# Patient Record
Sex: FEMALE
[De-identification: names, ages, dates, MRNs, and addresses within clinical notes are randomized per-mention and may not be internally consistent; named-entity substitution may affect disease eponyms.]

---

## 2017-12-18 PROCEDURE — 87081 CULTURE SCREEN ONLY: CPT | Performed by: OBSTETRICS & GYNECOLOGY

## 2017-12-18 PROCEDURE — 87653 STREP B DNA AMP PROBE: CPT | Performed by: OBSTETRICS & GYNECOLOGY

## 2017-12-19 PROBLEM — O99.013 ANEMIA DURING PREGNANCY IN THIRD TRIMESTER: Status: ACTIVE | Noted: 2017-12-19

## 2018-01-01 ENCOUNTER — HOSPITAL (OUTPATIENT)
Dept: OTHER | Age: 28
End: 2018-01-01
Attending: OBSTETRICS & GYNECOLOGY

## 2018-01-16 ENCOUNTER — HOSPITAL (OUTPATIENT)
Dept: OTHER | Age: 28
End: 2018-01-16
Attending: OBSTETRICS & GYNECOLOGY

## 2018-01-16 LAB
ANALYZER ANC (IANC): ABNORMAL
BASE DEFICIT BLDCOA-SCNC: 4 MMOL/L
BASE DEFICIT BLDCOV-SCNC: 4 MMOL/L
BASE EXCESS BLDCOA CALC-SCNC: NORMAL MMOL/L
BASE EXCESS-RC: NORMAL
ERYTHROCYTE [DISTWIDTH] IN BLOOD: 13.7 % (ref 11–15)
HCO3 BLDCOA-SCNC: 22 MMOL/L (ref 21–28)
HCO3 BLDCOV-SCNC: 22 MMOL/L (ref 22–29)
HEMATOCRIT: 30.3 % (ref 36–46.5)
HEMATOCRIT: 31.6 % (ref 36–46.5)
HGB BLD-MCNC: 10.4 GM/DL (ref 12–15.5)
HGB BLD-MCNC: 9.9 GM/DL (ref 12–15.5)
HIV1 AB SERPL QL IA: NONREACTIVE
MCH RBC QN AUTO: 27.4 PG (ref 26–34)
MCHC RBC AUTO-ENTMCNC: 32.9 GM/DL (ref 32–36.5)
MCV RBC AUTO: 83.4 FL (ref 78–100)
PCO2 BLDCOA: 41 MM HG (ref 31–74)
PCO2 BLDCOV: 39 MM HG (ref 23–49)
PH BLDCOA: 7.33 UNIT (ref 7.18–7.38)
PH BLDCOV: 7.35 UNIT (ref 7.25–7.45)
PLATELET # BLD: 208 THOUSAND/MCL (ref 140–450)
PO2 BLDCOV: 23 MM HG (ref 17–41)
PO2 RC ARTERIAL CORD (RACPO): 23 MM HG (ref 6–31)
RBC # BLD: 3.79 MILLION/MCL (ref 4–5.2)
WBC # BLD: 12.3 THOUSAND/MCL (ref 4.2–11)

## 2018-01-17 LAB
ANALYZER ANC (IANC): ABNORMAL
ERYTHROCYTE [DISTWIDTH] IN BLOOD: 13.9 % (ref 11–15)
HEMATOCRIT: 29.2 % (ref 36–46.5)
HGB BLD-MCNC: 9.5 GM/DL (ref 12–15.5)
MCH RBC QN AUTO: 27.7 PG (ref 26–34)
MCHC RBC AUTO-ENTMCNC: 32.5 GM/DL (ref 32–36.5)
MCV RBC AUTO: 85.1 FL (ref 78–100)
PLATELET # BLD: 200 THOUSAND/MCL (ref 140–450)
RBC # BLD: 3.43 MILLION/MCL (ref 4–5.2)
WBC # BLD: 11.8 THOUSAND/MCL (ref 4.2–11)

## 2018-02-28 PROBLEM — O99.013 ANEMIA DURING PREGNANCY IN THIRD TRIMESTER: Status: RESOLVED | Noted: 2017-12-19 | Resolved: 2018-02-28

## 2018-02-28 PROCEDURE — 87624 HPV HI-RISK TYP POOLED RSLT: CPT | Performed by: OBSTETRICS & GYNECOLOGY

## 2018-02-28 PROCEDURE — 88175 CYTOPATH C/V AUTO FLUID REDO: CPT | Performed by: OBSTETRICS & GYNECOLOGY

## 2019-05-10 PROCEDURE — 88175 CYTOPATH C/V AUTO FLUID REDO: CPT | Performed by: OBSTETRICS & GYNECOLOGY

## 2019-05-10 PROCEDURE — 87624 HPV HI-RISK TYP POOLED RSLT: CPT | Performed by: OBSTETRICS & GYNECOLOGY

## 2020-02-14 PROBLEM — Z34.80 ENCOUNTER FOR SUPERVISION OF OTHER NORMAL PREGNANCY, UNSPECIFIED TRIMESTER: Status: ACTIVE | Noted: 2020-02-14

## 2020-02-27 PROBLEM — F17.200 SMOKER: Status: ACTIVE | Noted: 2020-02-27

## 2020-02-27 PROBLEM — Z98.890 HISTORY OF LOOP ELECTRICAL EXCISION PROCEDURE (LEEP): Status: ACTIVE | Noted: 2020-02-27

## 2020-04-09 ENCOUNTER — OFFICE VISIT (OUTPATIENT)
Dept: PERINATAL CARE | Facility: HOSPITAL | Age: 30
End: 2020-04-09
Attending: PEDIATRICS
Payer: COMMERCIAL

## 2020-04-09 PROCEDURE — 76827 ECHO EXAM OF FETAL HEART: CPT

## 2020-04-09 PROCEDURE — 93325 DOPPLER ECHO COLOR FLOW MAPG: CPT

## 2020-04-09 PROCEDURE — 76825 ECHO EXAM OF FETAL HEART: CPT

## 2020-04-09 NOTE — PROGRESS NOTES
Zuleyka Monae    Dear Dr. Eloise Elizondo     Thank you for requesting fetal echocardiogram and  cardiology consultation  consultation on your patient Rosas.   As you are aware she is a 34year old female  with a gutierrez VAGINAL BLEEDING, VARICELLA, or X-RAY EXPOSURE.     Past Surgical History  The patient  has a past surgical history that includes leep (12/2014); leep (04/2015); and appendectomy.     Family History  The patient She indicated that her mother is alive.  She interrogations. The aortic arch was a left aortic arch. The aortic arch appeared to be normal without any evidence of coarctation. There was no pericardial effusion. The fetal heart rates were normal with one to one conduction.    The ductus venous trajecto face-to-face time was 60 minutes.

## 2020-05-06 ENCOUNTER — OFFICE VISIT (OUTPATIENT)
Dept: PERINATAL CARE | Facility: HOSPITAL | Age: 30
End: 2020-05-06
Attending: PEDIATRICS
Payer: COMMERCIAL

## 2020-05-06 PROCEDURE — 76825 ECHO EXAM OF FETAL HEART: CPT

## 2020-05-06 PROCEDURE — 76827 ECHO EXAM OF FETAL HEART: CPT

## 2020-05-06 PROCEDURE — 93325 DOPPLER ECHO COLOR FLOW MAPG: CPT

## 2020-05-06 NOTE — PROGRESS NOTES
Mellissa Shaw  Dear Dr. Hollis,     Thank you for requesting fetal echocardiogram and  cardiology consultation  consultation on your patient Tamara San.  As you are aware she is a 34year old female  with a singleto NAUSEA/VOMITING, NERVOUS/MENTAL DISORDER, NEURAL TUBE DEFECT, PREVIOUS STILL BIRTH, PULMONARY DISEASE, SEIZURE DISORDER, SICKLE CELL, SWELLING, KARYNA SACHS, THALLASEMIA, TOXOPLASMOSIS, TYPE 1 DIABETES, URINARY PROBLEMS, VAGINAL BLEEDING, VARICELLA, or X-RAY normal with no stenosis. There was normal bifurcation of the main pulmonary artery. The left pulmonary artery was not studied in detail. The ductus arteriosus was open with a normal right to left shunting.  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 ? Levorotation. No obvious right sided intra thoracic mass was noted. No obvious left lung hypoplasia. I answered her questions.  She appeared to have a good understanding the issues we covered.     Thank you for allowing me to part

## 2020-05-13 PROBLEM — F32.A DEPRESSION AFFECTING PREGNANCY IN THIRD TRIMESTER, ANTEPARTUM: Status: ACTIVE | Noted: 2020-05-13

## 2020-05-13 PROBLEM — O99.343 DEPRESSION AFFECTING PREGNANCY IN THIRD TRIMESTER, ANTEPARTUM: Status: ACTIVE | Noted: 2020-05-13

## 2020-07-01 ENCOUNTER — OFFICE VISIT (OUTPATIENT)
Dept: PERINATAL CARE | Facility: HOSPITAL | Age: 30
End: 2020-07-01
Attending: PEDIATRICS
Payer: COMMERCIAL

## 2020-07-01 PROCEDURE — 93325 DOPPLER ECHO COLOR FLOW MAPG: CPT

## 2020-07-01 PROCEDURE — 76827 ECHO EXAM OF FETAL HEART: CPT

## 2020-07-01 PROCEDURE — 76825 ECHO EXAM OF FETAL HEART: CPT

## 2020-07-01 NOTE — PROGRESS NOTES
Liberty Blood  Dear Dr. Hollis,     Thank you for requesting a follow up fetal echocardiogram and follow up   cardiology consultation  consultation on your patient Tamara San.  As you are aware she is a 34year old female DYSTROPHY, NAUSEA/VOMITING, NERVOUS/MENTAL DISORDER, NEURAL TUBE DEFECT, PREVIOUS STILL BIRTH, PULMONARY DISEASE, SEIZURE DISORDER, SICKLE CELL, SWELLING, KARYNA SACHS, THALLASEMIA, TOXOPLASMOSIS, TYPE 1 DIABETES, URINARY PROBLEMS, VAGINAL BLEEDING, VARICELLA valve appeared to be normal with no stenosis. There was normal bifurcation of the main pulmonary artery. The ductus arteriosus was open with a normal right to left shunting. Two pulmonary veins appeared to be entering into the left atrium.  The mitral valv rhabdomyoma was noted. ? Levorotation. No obvious right sided intra thoracic mass was noted. No obvious left lung hypoplasia. I answered her questions.  She appeared to have a good understanding the issues we covered.     Thank you for allowing me to part

## 2020-08-07 ENCOUNTER — HOSPITAL ENCOUNTER (INPATIENT)
Facility: HOSPITAL | Age: 30
LOS: 2 days | Discharge: HOME | End: 2020-08-09
Attending: OBSTETRICS & GYNECOLOGY | Admitting: OBSTETRICS & GYNECOLOGY
Payer: COMMERCIAL

## 2020-08-07 ENCOUNTER — ANESTHESIA (OUTPATIENT)
Dept: OBGYN UNIT | Facility: HOSPITAL | Age: 30
End: 2020-08-07
Payer: COMMERCIAL

## 2020-08-07 ENCOUNTER — ANESTHESIA EVENT (OUTPATIENT)
Dept: OBGYN UNIT | Facility: HOSPITAL | Age: 30
End: 2020-08-07
Payer: COMMERCIAL

## 2020-08-07 PROBLEM — Z34.90 PREGNANCY: Status: ACTIVE | Noted: 2020-08-07

## 2020-08-07 LAB
ANTIBODY SCREEN: NEGATIVE
BASOPHILS # BLD AUTO: 0.06 X10(3) UL (ref 0–0.2)
BASOPHILS NFR BLD AUTO: 0.3 %
DEPRECATED RDW RBC AUTO: 40 FL (ref 35.1–46.3)
EOSINOPHIL # BLD AUTO: 0.05 X10(3) UL (ref 0–0.7)
EOSINOPHIL NFR BLD AUTO: 0.3 %
ERYTHROCYTE [DISTWIDTH] IN BLOOD BY AUTOMATED COUNT: 12.9 % (ref 11–15)
HCT VFR BLD AUTO: 33.4 % (ref 35–48)
HGB BLD-MCNC: 11.2 G/DL (ref 12–16)
IMM GRANULOCYTES # BLD AUTO: 0.21 X10(3) UL (ref 0–1)
IMM GRANULOCYTES NFR BLD: 1.2 %
LYMPHOCYTES # BLD AUTO: 1.62 X10(3) UL (ref 1–4)
LYMPHOCYTES NFR BLD AUTO: 8.9 %
MCH RBC QN AUTO: 28.9 PG (ref 26–34)
MCHC RBC AUTO-ENTMCNC: 33.5 G/DL (ref 31–37)
MCV RBC AUTO: 86.1 FL (ref 80–100)
MONOCYTES # BLD AUTO: 1.61 X10(3) UL (ref 0.1–1)
MONOCYTES NFR BLD AUTO: 8.9 %
NEUTROPHILS # BLD AUTO: 14.56 X10 (3) UL (ref 1.5–7.7)
NEUTROPHILS # BLD AUTO: 14.56 X10(3) UL (ref 1.5–7.7)
NEUTROPHILS NFR BLD AUTO: 80.4 %
PLATELET # BLD AUTO: 187 10(3)UL (ref 150–450)
RBC # BLD AUTO: 3.88 X10(6)UL (ref 3.8–5.3)
RH BLOOD TYPE: POSITIVE
SARS-COV-2 RNA RESP QL NAA+PROBE: DETECTED
T PALLIDUM AB SER QL IA: NONREACTIVE
WBC # BLD AUTO: 18.1 X10(3) UL (ref 4–11)

## 2020-08-07 PROCEDURE — 87205 SMEAR GRAM STAIN: CPT | Performed by: OBSTETRICS & GYNECOLOGY

## 2020-08-07 PROCEDURE — 87070 CULTURE OTHR SPECIMN AEROBIC: CPT | Performed by: OBSTETRICS & GYNECOLOGY

## 2020-08-07 PROCEDURE — 85025 COMPLETE CBC W/AUTO DIFF WBC: CPT | Performed by: OBSTETRICS & GYNECOLOGY

## 2020-08-07 PROCEDURE — 99214 OFFICE O/P EST MOD 30 MIN: CPT

## 2020-08-07 PROCEDURE — 86900 BLOOD TYPING SEROLOGIC ABO: CPT | Performed by: OBSTETRICS & GYNECOLOGY

## 2020-08-07 PROCEDURE — 86901 BLOOD TYPING SEROLOGIC RH(D): CPT | Performed by: OBSTETRICS & GYNECOLOGY

## 2020-08-07 PROCEDURE — 80053 COMPREHEN METABOLIC PANEL: CPT | Performed by: INTERNAL MEDICINE

## 2020-08-07 PROCEDURE — 0HQ9XZZ REPAIR PERINEUM SKIN, EXTERNAL APPROACH: ICD-10-PCS | Performed by: OBSTETRICS & GYNECOLOGY

## 2020-08-07 PROCEDURE — 88307 TISSUE EXAM BY PATHOLOGIST: CPT | Performed by: OBSTETRICS & GYNECOLOGY

## 2020-08-07 PROCEDURE — 87075 CULTR BACTERIA EXCEPT BLOOD: CPT | Performed by: OBSTETRICS & GYNECOLOGY

## 2020-08-07 PROCEDURE — 82728 ASSAY OF FERRITIN: CPT | Performed by: INTERNAL MEDICINE

## 2020-08-07 PROCEDURE — 86780 TREPONEMA PALLIDUM: CPT | Performed by: OBSTETRICS & GYNECOLOGY

## 2020-08-07 PROCEDURE — 87077 CULTURE AEROBIC IDENTIFY: CPT | Performed by: OBSTETRICS & GYNECOLOGY

## 2020-08-07 PROCEDURE — 87076 CULTURE ANAEROBE IDENT EACH: CPT | Performed by: OBSTETRICS & GYNECOLOGY

## 2020-08-07 PROCEDURE — 86850 RBC ANTIBODY SCREEN: CPT | Performed by: OBSTETRICS & GYNECOLOGY

## 2020-08-07 RX ORDER — BISACODYL 10 MG
10 SUPPOSITORY, RECTAL RECTAL ONCE AS NEEDED
Status: DISCONTINUED | OUTPATIENT
Start: 2020-08-07 | End: 2020-08-09

## 2020-08-07 RX ORDER — TERBUTALINE SULFATE 1 MG/ML
0.25 INJECTION, SOLUTION SUBCUTANEOUS AS NEEDED
Status: DISCONTINUED | OUTPATIENT
Start: 2020-08-07 | End: 2020-08-07 | Stop reason: HOSPADM

## 2020-08-07 RX ORDER — IBUPROFEN 600 MG/1
600 TABLET ORAL EVERY 6 HOURS
Status: DISCONTINUED | OUTPATIENT
Start: 2020-08-07 | End: 2020-08-09

## 2020-08-07 RX ORDER — IBUPROFEN 600 MG/1
600 TABLET ORAL EVERY 6 HOURS PRN
Status: DISCONTINUED | OUTPATIENT
Start: 2020-08-07 | End: 2020-08-07

## 2020-08-07 RX ORDER — ACETAMINOPHEN 500 MG
500 TABLET ORAL EVERY 6 HOURS PRN
Status: DISCONTINUED | OUTPATIENT
Start: 2020-08-07 | End: 2020-08-07

## 2020-08-07 RX ORDER — ACETAMINOPHEN 325 MG/1
650 TABLET ORAL EVERY 6 HOURS PRN
Status: DISCONTINUED | OUTPATIENT
Start: 2020-08-07 | End: 2020-08-09

## 2020-08-07 RX ORDER — ONDANSETRON 2 MG/ML
4 INJECTION INTRAMUSCULAR; INTRAVENOUS EVERY 6 HOURS PRN
Status: DISCONTINUED | OUTPATIENT
Start: 2020-08-07 | End: 2020-08-07 | Stop reason: HOSPADM

## 2020-08-07 RX ORDER — DIPHENHYDRAMINE HYDROCHLORIDE 50 MG/ML
12.5 INJECTION INTRAMUSCULAR; INTRAVENOUS EVERY 4 HOURS PRN
Status: DISCONTINUED | OUTPATIENT
Start: 2020-08-07 | End: 2020-08-07

## 2020-08-07 RX ORDER — SIMETHICONE 80 MG
80 TABLET,CHEWABLE ORAL 3 TIMES DAILY PRN
Status: DISCONTINUED | OUTPATIENT
Start: 2020-08-07 | End: 2020-08-09

## 2020-08-07 RX ORDER — EPHEDRINE SULFATE/0.9% NACL/PF 25 MG/5 ML
5 SYRINGE (ML) INTRAVENOUS AS NEEDED
Status: DISCONTINUED | OUTPATIENT
Start: 2020-08-07 | End: 2020-08-07

## 2020-08-07 RX ORDER — AMMONIA INHALANTS 0.04 G/.3ML
0.3 INHALANT RESPIRATORY (INHALATION) AS NEEDED
Status: DISCONTINUED | OUTPATIENT
Start: 2020-08-07 | End: 2020-08-07 | Stop reason: HOSPADM

## 2020-08-07 RX ORDER — DOCUSATE SODIUM 100 MG/1
100 CAPSULE, LIQUID FILLED ORAL
Status: DISCONTINUED | OUTPATIENT
Start: 2020-08-07 | End: 2020-08-09

## 2020-08-07 RX ORDER — TRISODIUM CITRATE DIHYDRATE AND CITRIC ACID MONOHYDRATE 500; 334 MG/5ML; MG/5ML
30 SOLUTION ORAL AS NEEDED
Status: DISCONTINUED | OUTPATIENT
Start: 2020-08-07 | End: 2020-08-07 | Stop reason: HOSPADM

## 2020-08-07 NOTE — ANESTHESIA PROCEDURE NOTES
Labor Analgesia  Performed by: Royal Efren MD  Authorized by: Royal Efren MD       General Information and Staff    Start Time:   Anesthesiologist: Royal Efren MD  Performed by:   Anesthesiologist  Patient Location:  OB  Site Identification: surface land

## 2020-08-07 NOTE — PROGRESS NOTES
Pt is a 27year old female admitted to TR5/[facility]5-A. Patient presents with:  R/o Labor:  since 12 noon     Pt is  38w2d intra-uterine pregnancy. History obtained, consents signed. Oriented to room, staff, and plan of care.   Updated Malgorzata Pacheco about

## 2020-08-07 NOTE — ANESTHESIA PREPROCEDURE EVALUATION
PRE-OP EVALUATION    Patient Name: Radha San    Pre-op Diagnosis: labor pains, IUP@ 38.2 weeks    Labor epidural    Pre-op vitals reviewed. Pulse: 116  BP: 135/87     Body mass index is 29.87 kg/m². Current medications reviewed.   JENNY THAPA History    Tobacco Use      Smoking status: Current Every Day Smoker        Packs/day: 0.30        Years: 4.00        Pack years: 1.2        Types: Cigarettes        Quit date: 12/1/2016        Years since quitting: 3.6      Smokeless tobacco: Former User

## 2020-08-07 NOTE — ANESTHESIA PREPROCEDURE EVALUATION
PRE-OP EVALUATION    Patient Name: Lorne San    Pre-op Diagnosis: * No pre-op diagnosis entered *    * No procedures listed *    * No surgeons found in log *    Pre-op vitals reviewed. Pulse: 116  BP: 135/87     Body mass index is 29.87 kg/m². Past Surgical History:   Procedure Laterality Date   • APPENDECTOMY      04/2007   • LEEP  12/2014   • LEEP  04/2015     Social History    Tobacco Use      Smoking status: Current Every Day Smoker        Packs/day: 0.30        Years: 4.00

## 2020-08-07 NOTE — H&P
[address] and Delivery Prenatal History and Physical Interval Addendum  Please see full Prenatal Record for this pregnancy      SUBJECTIVE:    Interval History:      This is a pregnancy at 38 weeks admitted for labor beginning this afternoon  U

## 2020-08-08 ENCOUNTER — APPOINTMENT (OUTPATIENT)
Dept: GENERAL RADIOLOGY | Facility: HOSPITAL | Age: 30
End: 2020-08-08
Attending: INTERNAL MEDICINE
Payer: COMMERCIAL

## 2020-08-08 LAB
ALBUMIN SERPL-MCNC: 3.2 G/DL (ref 3.4–5)
ALBUMIN/GLOB SERPL: 0.8 {RATIO} (ref 1–2)
ALP LIVER SERPL-CCNC: 182 U/L (ref 37–98)
ALT SERPL-CCNC: 11 U/L (ref 13–56)
ANION GAP SERPL CALC-SCNC: 7 MMOL/L (ref 0–18)
AST SERPL-CCNC: 14 U/L (ref 15–37)
BASOPHILS # BLD AUTO: 0.05 X10(3) UL (ref 0–0.2)
BASOPHILS NFR BLD AUTO: 0.3 %
BILIRUB SERPL-MCNC: 0.5 MG/DL (ref 0.1–2)
BUN BLD-MCNC: 5 MG/DL (ref 7–18)
BUN/CREAT SERPL: 7.8 (ref 10–20)
CALCIUM BLD-MCNC: 8.6 MG/DL (ref 8.5–10.1)
CHLORIDE SERPL-SCNC: 107 MMOL/L (ref 98–112)
CO2 SERPL-SCNC: 21 MMOL/L (ref 21–32)
CREAT BLD-MCNC: 0.64 MG/DL (ref 0.55–1.02)
DEPRECATED HBV CORE AB SER IA-ACNC: 4.2 NG/ML (ref 12–160)
DEPRECATED RDW RBC AUTO: 40.7 FL (ref 35.1–46.3)
EOSINOPHIL # BLD AUTO: 0.07 X10(3) UL (ref 0–0.7)
EOSINOPHIL NFR BLD AUTO: 0.4 %
ERYTHROCYTE [DISTWIDTH] IN BLOOD BY AUTOMATED COUNT: 12.9 % (ref 11–15)
GLOBULIN PLAS-MCNC: 3.9 G/DL (ref 2.8–4.4)
GLUCOSE BLD-MCNC: 92 MG/DL (ref 70–99)
HCT VFR BLD AUTO: 30.4 % (ref 35–48)
HGB BLD-MCNC: 9.7 G/DL (ref 12–16)
IMM GRANULOCYTES # BLD AUTO: 0.25 X10(3) UL (ref 0–1)
IMM GRANULOCYTES NFR BLD: 1.6 %
LYMPHOCYTES # BLD AUTO: 2.25 X10(3) UL (ref 1–4)
LYMPHOCYTES NFR BLD AUTO: 14 %
M PROTEIN MFR SERPL ELPH: 7.1 G/DL (ref 6.4–8.2)
MCH RBC QN AUTO: 28.2 PG (ref 26–34)
MCHC RBC AUTO-ENTMCNC: 31.9 G/DL (ref 31–37)
MCV RBC AUTO: 88.4 FL (ref 80–100)
MONOCYTES # BLD AUTO: 1.23 X10(3) UL (ref 0.1–1)
MONOCYTES NFR BLD AUTO: 7.7 %
NEUTROPHILS # BLD AUTO: 12.19 X10 (3) UL (ref 1.5–7.7)
NEUTROPHILS # BLD AUTO: 12.19 X10(3) UL (ref 1.5–7.7)
NEUTROPHILS NFR BLD AUTO: 76 %
OSMOLALITY SERPL CALC.SUM OF ELEC: 277 MOSM/KG (ref 275–295)
PLATELET # BLD AUTO: 187 10(3)UL (ref 150–450)
POTASSIUM SERPL-SCNC: 4.2 MMOL/L (ref 3.5–5.1)
RBC # BLD AUTO: 3.44 X10(6)UL (ref 3.8–5.3)
SODIUM SERPL-SCNC: 135 MMOL/L (ref 136–145)
WBC # BLD AUTO: 16 X10(3) UL (ref 4–11)

## 2020-08-08 PROCEDURE — 71045 X-RAY EXAM CHEST 1 VIEW: CPT | Performed by: INTERNAL MEDICINE

## 2020-08-08 PROCEDURE — 85025 COMPLETE CBC W/AUTO DIFF WBC: CPT | Performed by: OBSTETRICS & GYNECOLOGY

## 2020-08-08 NOTE — PROGRESS NOTES
Labor Analgesia Follow Up Note    Patient underwent epidural anesthesia for labor analgesia,    Placenta Date/Time: 8/7/2020  7:26 PM    Delivery Date/Time[de-identified] 8/7/2020  7:18 PM    /78   Pulse 79   Temp 97.8 °F (36.6 °C) (Oral)   Resp 16   Ht 1.626 m (

## 2020-08-08 NOTE — PROGRESS NOTES
Pt desires discharge today  Instructions given  Follow up 6 weeks  Circ done    Pt and  advised n hypercoagulability of pregnancy, postpartum state and Covid 19. Lovenox SQ advised for 6 weeks. Pt and  decline Lovenox.   ASA 81 mg as an [employer]

## 2020-08-08 NOTE — PROGRESS NOTES
Notified Dr Thelma Krishna that pt may want to go home tonight. Dr stated pt ok for discharge on her end. Informed MD of chest xray result.

## 2020-08-08 NOTE — PROGRESS NOTES
Ligia San Patient Status:  Inpatient    1990 MRN [id number]   Location [facility] 1SW-J Attending Pretty Hobbs MD   Hosp Day # 1 PCP Mary Xiong,      Pt has no complaints  Breast Feeding:  yes    Afebrile  VS stable  Abdom

## 2020-08-08 NOTE — L&D DELIVERY NOTE
This patient was admitted to the hospital and proceeded to have a vaginal birth. A male infant was delivered. Pt complete, AROM clear. Then had urge to push. Head delivery was controlled. Fetal body delivered without difficulty.   Nares and mouth were

## 2020-08-08 NOTE — PROGRESS NOTES
Pt was admitted in labor  She was in active labor and requested epidural.   I saw pt as she was awaiting epidural. I was in the room for approximately 2 minutes.   I returned after her epidural was placed and did a vaginal exam. Was in room for approximatel

## 2020-08-08 NOTE — PROGRESS NOTES
Pt transferred via wheelchair in stable condition to room 1119. Infant transferred via isolette in stable condition to room 1119. FOB @ their side. ID bands checked and verified. Report given to [facility], RN.

## 2020-08-09 VITALS
WEIGHT: 174 LBS | HEIGHT: 64 IN | SYSTOLIC BLOOD PRESSURE: 124 MMHG | OXYGEN SATURATION: 99 % | HEART RATE: 78 BPM | BODY MASS INDEX: 29.71 KG/M2 | TEMPERATURE: 98 F | RESPIRATION RATE: 18 BRPM | DIASTOLIC BLOOD PRESSURE: 77 MMHG

## 2020-08-09 LAB
ALBUMIN SERPL-MCNC: 2.4 G/DL (ref 3.4–5)
ALBUMIN/GLOB SERPL: 0.7 {RATIO} (ref 1–2)
ALP LIVER SERPL-CCNC: 117 U/L (ref 37–98)
ALT SERPL-CCNC: 11 U/L (ref 13–56)
ANION GAP SERPL CALC-SCNC: 3 MMOL/L (ref 0–18)
AST SERPL-CCNC: 19 U/L (ref 15–37)
BASOPHILS # BLD AUTO: 0.05 X10(3) UL (ref 0–0.2)
BASOPHILS NFR BLD AUTO: 0.4 %
BILIRUB SERPL-MCNC: 0.3 MG/DL (ref 0.1–2)
BUN BLD-MCNC: 6 MG/DL (ref 7–18)
BUN/CREAT SERPL: 8.6 (ref 10–20)
CALCIUM BLD-MCNC: 8.8 MG/DL (ref 8.5–10.1)
CHLORIDE SERPL-SCNC: 110 MMOL/L (ref 98–112)
CO2 SERPL-SCNC: 24 MMOL/L (ref 21–32)
CREAT BLD-MCNC: 0.7 MG/DL (ref 0.55–1.02)
D-DIMER: 0.48 UG/ML FEU (ref ?–0.5)
DEPRECATED RDW RBC AUTO: 42.5 FL (ref 35.1–46.3)
EOSINOPHIL # BLD AUTO: 0.14 X10(3) UL (ref 0–0.7)
EOSINOPHIL NFR BLD AUTO: 1.2 %
ERYTHROCYTE [DISTWIDTH] IN BLOOD BY AUTOMATED COUNT: 13.1 % (ref 11–15)
GLOBULIN PLAS-MCNC: 3.6 G/DL (ref 2.8–4.4)
GLUCOSE BLD-MCNC: 76 MG/DL (ref 70–99)
HCT VFR BLD AUTO: 31.3 % (ref 35–48)
HGB BLD-MCNC: 10.1 G/DL (ref 12–16)
IMM GRANULOCYTES # BLD AUTO: 0.19 X10(3) UL (ref 0–1)
IMM GRANULOCYTES NFR BLD: 1.7 %
LYMPHOCYTES # BLD AUTO: 2.39 X10(3) UL (ref 1–4)
LYMPHOCYTES NFR BLD AUTO: 21.2 %
M PROTEIN MFR SERPL ELPH: 6 G/DL (ref 6.4–8.2)
MCH RBC QN AUTO: 28.6 PG (ref 26–34)
MCHC RBC AUTO-ENTMCNC: 32.3 G/DL (ref 31–37)
MCV RBC AUTO: 88.7 FL (ref 80–100)
MONOCYTES # BLD AUTO: 0.78 X10(3) UL (ref 0.1–1)
MONOCYTES NFR BLD AUTO: 6.9 %
NEUTROPHILS # BLD AUTO: 7.74 X10 (3) UL (ref 1.5–7.7)
NEUTROPHILS # BLD AUTO: 7.74 X10(3) UL (ref 1.5–7.7)
NEUTROPHILS NFR BLD AUTO: 68.6 %
OSMOLALITY SERPL CALC.SUM OF ELEC: 280 MOSM/KG (ref 275–295)
PLATELET # BLD AUTO: 214 10(3)UL (ref 150–450)
POTASSIUM SERPL-SCNC: 3.9 MMOL/L (ref 3.5–5.1)
RBC # BLD AUTO: 3.53 X10(6)UL (ref 3.8–5.3)
SODIUM SERPL-SCNC: 137 MMOL/L (ref 136–145)
WBC # BLD AUTO: 11.3 X10(3) UL (ref 4–11)

## 2020-08-09 PROCEDURE — 85025 COMPLETE CBC W/AUTO DIFF WBC: CPT | Performed by: INTERNAL MEDICINE

## 2020-08-09 PROCEDURE — 80053 COMPREHEN METABOLIC PANEL: CPT | Performed by: INTERNAL MEDICINE

## 2020-08-09 PROCEDURE — 85379 FIBRIN DEGRADATION QUANT: CPT | Performed by: INTERNAL MEDICINE

## 2020-08-09 RX ORDER — ESCITALOPRAM OXALATE 10 MG/1
10 TABLET ORAL DAILY
Qty: 30 TABLET | Refills: 4 | Status: SHIPPED | OUTPATIENT
Start: 2020-08-09 | End: 2021-11-02

## 2020-08-09 RX ORDER — ENOXAPARIN SODIUM 100 MG/ML
40 INJECTION SUBCUTANEOUS DAILY
Qty: 45 SYRINGE | Refills: 0 | Status: SHIPPED | OUTPATIENT
Start: 2020-08-09 | End: 2020-09-24 | Stop reason: ALTCHOICE

## 2020-08-09 RX ORDER — ENOXAPARIN SODIUM 100 MG/ML
40 INJECTION SUBCUTANEOUS DAILY
Status: DISCONTINUED | OUTPATIENT
Start: 2020-08-09 | End: 2020-08-09

## 2020-08-09 NOTE — PROGRESS NOTES
Instructed patient on administration of lovenox patient returned demonstration. Patient verbalized understanding.

## 2020-08-09 NOTE — PROGRESS NOTES
INFECTIOUS DISEASE TELEMEDICINE PROGRESS NOTE    Evelia San Patient Status:  Inpatient    1990 MRN [id number]   [facility] 1SW-J Attending Bronwyn Arellano MD   [facility] Da CA 8.8 08/09/2020    ALB 2.4 08/09/2020    ALKPHO 117 08/09/2020    BILT 0.3 08/09/2020    TP 6.0 08/09/2020    AST 19 08/09/2020    ALT 11 08/09/2020    DDIMER 0.48 08/09/2020       Microbiologic Data:   (this admission)    Reviewed    Imaging:  X-Ray

## 2020-08-09 NOTE — PLAN OF CARE
Problem: POSTPARTUM  Goal: Long Term Goal:Experiences normal postpartum course  Description  INTERVENTIONS:  - Assess and monitor vital signs and lab values. - Assess fundus and lochia. - Provide ice/sitz baths for perineum discomfort.   - Monitor heali pain/trauma. - Instruct and provide assistance with proper latch. - Review techniques for milk expression (breast pumping) and storage of breast milk. Provide pumping equipment/supplies, instructions and assistance, as needed.   - Encourage rooming-in and no

## 2020-08-09 NOTE — PROGRESS NOTES
Patient decided to stay throughout the night due to infant's jaundice levels being elevated and infant feeding appearing to make a small amount of progress.

## 2020-08-10 PROBLEM — Z34.80 ENCOUNTER FOR SUPERVISION OF OTHER NORMAL PREGNANCY, UNSPECIFIED TRIMESTER: Status: RESOLVED | Noted: 2020-02-14 | Resolved: 2020-08-07

## 2020-08-12 ENCOUNTER — TELEPHONE (OUTPATIENT)
Dept: OBGYN UNIT | Facility: HOSPITAL | Age: 30
End: 2020-08-12

## 2020-08-12 NOTE — PROGRESS NOTES
Reviewed self and infant care w / mom, she verbalizes understanding of instructions reviewed. Encourage to follow up w/ MDs as directed and w/ questions/concerns. Denies ppd and bb now, has Lexapro at home but has not started it yet, care reviewed.

## 2020-09-23 PROBLEM — F32.A DEPRESSION AFFECTING PREGNANCY IN THIRD TRIMESTER, ANTEPARTUM: Status: RESOLVED | Noted: 2020-05-13 | Resolved: 2020-09-23

## 2020-09-23 PROBLEM — Z98.890 HISTORY OF LOOP ELECTRICAL EXCISION PROCEDURE (LEEP): Status: RESOLVED | Noted: 2020-02-27 | Resolved: 2020-09-23

## 2020-09-23 PROBLEM — Z34.90 PREGNANCY: Status: RESOLVED | Noted: 2020-08-07 | Resolved: 2020-09-23

## 2020-09-23 PROBLEM — O99.343 DEPRESSION AFFECTING PREGNANCY IN THIRD TRIMESTER, ANTEPARTUM: Status: RESOLVED | Noted: 2020-05-13 | Resolved: 2020-09-23
